# Patient Record
Sex: MALE | NOT HISPANIC OR LATINO | Employment: OTHER | URBAN - METROPOLITAN AREA
[De-identification: names, ages, dates, MRNs, and addresses within clinical notes are randomized per-mention and may not be internally consistent; named-entity substitution may affect disease eponyms.]

---

## 2023-04-25 LAB — CALCIDIOL (25 OH VITAMIN D3) (NG/ML) IN SER/PLAS: 79 NG/ML

## 2023-06-07 LAB
ESTIMATED AVERAGE GLUCOSE FOR HBA1C: 117 MG/DL
HEMOGLOBIN A1C/HEMOGLOBIN TOTAL IN BLOOD: 5.7 %

## 2023-06-13 LAB
ALBUMIN ELP: 4.4 G/DL (ref 3.4–5)
ALPHA 1: 0.3 G/DL (ref 0.2–0.6)
ALPHA 2: 0.7 G/DL (ref 0.4–1.1)
BETA: 0.9 G/DL (ref 0.5–1.2)
GAMMA GLOBULIN: 1.1 G/DL (ref 0.5–1.4)
PATH REVIEW - SERUM IMMUNOFIXATION: NORMAL
PATH REVIEW-SERUM PROTEIN ELECTROPHORESIS: NORMAL
PROTEIN ELECTROPHORESIS INTERPRETATION: NORMAL
PROTEIN TOTAL: 7.5 G/DL (ref 6.4–8.2)
SERUM IMMUNOFIXATION INTERPRETATION: NORMAL

## 2023-12-04 PROBLEM — S72.142A: Status: ACTIVE | Noted: 2023-01-14

## 2023-12-04 PROBLEM — R35.0 URINARY FREQUENCY: Status: ACTIVE | Noted: 2022-08-29

## 2023-12-04 PROBLEM — I31.9 PERICARDITIS (HHS-HCC): Status: ACTIVE | Noted: 2023-12-04

## 2023-12-04 PROBLEM — D64.9 ANEMIA: Status: ACTIVE | Noted: 2023-12-04

## 2023-12-04 PROBLEM — F32.A DEPRESSION: Status: ACTIVE | Noted: 2021-11-11

## 2023-12-04 PROBLEM — R31.0 GROSS HEMATURIA: Status: ACTIVE | Noted: 2022-01-12

## 2023-12-04 PROBLEM — N39.41 URGE INCONTINENCE: Status: ACTIVE | Noted: 2022-08-29

## 2023-12-04 PROBLEM — J45.909 ASTHMA (HHS-HCC): Status: ACTIVE | Noted: 2021-11-11
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
## 2023-12-04 PROBLEM — K92.2 GI BLEED: Status: ACTIVE | Noted: 2023-12-04

## 2023-12-04 PROBLEM — I77.810 ASCENDING AORTA DILATATION (CMS-HCC): Status: ACTIVE | Noted: 2023-03-03

## 2023-12-04 PROBLEM — G60.3 IDIOPATHIC PROGRESSIVE NEUROPATHY: Status: ACTIVE | Noted: 2021-02-15

## 2023-12-04 PROBLEM — F32.9 MAJOR DEPRESSION: Status: ACTIVE | Noted: 2023-12-04

## 2023-12-04 PROBLEM — F41.1 GAD (GENERALIZED ANXIETY DISORDER): Status: ACTIVE | Noted: 2018-06-14

## 2023-12-04 PROBLEM — G47.33 OSA ON CPAP: Status: ACTIVE | Noted: 2021-11-11

## 2023-12-04 PROBLEM — R97.20 ELEVATED PSA: Status: ACTIVE | Noted: 2022-01-12

## 2023-12-04 PROBLEM — N40.0 BENIGN PROSTATIC HYPERPLASIA: Status: ACTIVE | Noted: 2021-11-11

## 2023-12-04 PROBLEM — K21.9 ESOPHAGEAL REFLUX: Status: ACTIVE | Noted: 2021-11-11

## 2023-12-04 PROBLEM — R39.15 URINARY URGENCY: Status: ACTIVE | Noted: 2022-08-29

## 2023-12-04 PROBLEM — M54.16 LUMBAR RADICULOPATHY, CHRONIC: Status: ACTIVE | Noted: 2021-03-25

## 2023-12-04 PROBLEM — S72.009A CLOSED FRACTURE OF HIP (MULTI): Status: ACTIVE | Noted: 2023-01-14

## 2023-12-04 PROBLEM — Z99.89 CONTINUOUS POSITIVE AIRWAY PRESSURE DEPENDENCE: Status: ACTIVE | Noted: 2023-12-04

## 2023-12-04 RX ORDER — LACTULOSE 10 G/15ML
20 SOLUTION ORAL; RECTAL 3 TIMES DAILY PRN
COMMUNITY
Start: 2023-01-19 | End: 2023-12-07 | Stop reason: ALTCHOICE

## 2023-12-04 RX ORDER — ZINC GLUCONATE 50 MG
50 TABLET ORAL
COMMUNITY

## 2023-12-04 RX ORDER — OXYBUTYNIN CHLORIDE 5 MG/1
5 TABLET ORAL
COMMUNITY
End: 2023-12-07 | Stop reason: ALTCHOICE

## 2023-12-04 RX ORDER — BUDESONIDE AND FORMOTEROL FUMARATE DIHYDRATE 160; 4.5 UG/1; UG/1
2 AEROSOL RESPIRATORY (INHALATION)
COMMUNITY
End: 2023-12-07 | Stop reason: ALTCHOICE

## 2023-12-04 RX ORDER — SOLIFENACIN SUCCINATE 10 MG/1
10 TABLET, FILM COATED ORAL DAILY
COMMUNITY
Start: 2022-08-29 | End: 2023-12-07 | Stop reason: ALTCHOICE

## 2023-12-04 RX ORDER — SERTRALINE HYDROCHLORIDE 100 MG/1
200 TABLET, FILM COATED ORAL
COMMUNITY
Start: 2020-02-26

## 2023-12-04 RX ORDER — ALBUTEROL SULFATE 90 UG/1
2 AEROSOL, METERED RESPIRATORY (INHALATION) EVERY 4 HOURS PRN
COMMUNITY
Start: 2020-02-26

## 2023-12-04 RX ORDER — BUPROPION HYDROCHLORIDE 75 MG/1
75 TABLET ORAL 2 TIMES DAILY
COMMUNITY

## 2023-12-04 RX ORDER — AMOXICILLIN 250 MG
2 CAPSULE ORAL 2 TIMES DAILY
COMMUNITY
Start: 2023-01-19 | End: 2023-12-07 | Stop reason: ALTCHOICE

## 2023-12-04 RX ORDER — FINASTERIDE 5 MG/1
5 TABLET, FILM COATED ORAL
COMMUNITY
Start: 2023-01-19 | End: 2023-12-07 | Stop reason: ALTCHOICE

## 2023-12-04 RX ORDER — MONTELUKAST SODIUM 10 MG/1
10 TABLET ORAL
COMMUNITY
Start: 2022-08-03 | End: 2023-12-07 | Stop reason: ALTCHOICE

## 2023-12-04 RX ORDER — CARBIDOPA AND LEVODOPA 25; 100 MG/1; MG/1
1 TABLET ORAL 3 TIMES DAILY
COMMUNITY
Start: 2023-02-15 | End: 2023-12-07 | Stop reason: ALTCHOICE

## 2023-12-04 RX ORDER — MIRABEGRON 25 MG/1
25 TABLET, FILM COATED, EXTENDED RELEASE ORAL
COMMUNITY
Start: 2021-09-22 | End: 2023-12-07 | Stop reason: ALTCHOICE

## 2023-12-07 ENCOUNTER — OFFICE VISIT (OUTPATIENT)
Dept: NEUROLOGY | Facility: CLINIC | Age: 63
End: 2023-12-07
Payer: COMMERCIAL

## 2023-12-07 VITALS
DIASTOLIC BLOOD PRESSURE: 77 MMHG | HEIGHT: 75 IN | HEART RATE: 71 BPM | SYSTOLIC BLOOD PRESSURE: 149 MMHG | BODY MASS INDEX: 26.7 KG/M2 | WEIGHT: 214.7 LBS | TEMPERATURE: 98.6 F

## 2023-12-07 DIAGNOSIS — G62.89 OTHER POLYNEUROPATHY: Primary | ICD-10-CM

## 2023-12-07 PROCEDURE — 99213 OFFICE O/P EST LOW 20 MIN: CPT | Performed by: PSYCHIATRY & NEUROLOGY

## 2023-12-07 PROCEDURE — 1036F TOBACCO NON-USER: CPT | Performed by: PSYCHIATRY & NEUROLOGY

## 2023-12-07 RX ORDER — VIT C/E/ZN/COPPR/LUTEIN/ZEAXAN 250MG-90MG
25 CAPSULE ORAL DAILY
COMMUNITY

## 2023-12-07 RX ORDER — CLOBETASOL PROPIONATE 0.5 MG/G
1 CREAM TOPICAL
COMMUNITY
Start: 2023-09-13

## 2023-12-07 RX ORDER — DOCUSATE SODIUM 100 MG/1
100 CAPSULE, LIQUID FILLED ORAL
COMMUNITY
Start: 2023-01-31 | End: 2023-12-07 | Stop reason: ALTCHOICE

## 2023-12-07 RX ORDER — VITAMIN B COMPLEX
1 CAPSULE ORAL DAILY
COMMUNITY

## 2023-12-07 ASSESSMENT — PAIN SCALES - GENERAL: PAINLEVEL: 2

## 2023-12-07 NOTE — PROGRESS NOTES
"Subjective   Aurelio Jones is a 63 y.o. male who comes in for follow up of initial evaluation of back issues.    He saw a sport medicine doctor who did an MRI and then did a bone scan.  Then he had an MRI with contrast.  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  He reports that his neuropathy symptoms are stable.     He has plateau with PT.  He has pain in the left hip and lateral thigh.  He is getting a shot in the hip coming up and he is going to see a spine doctor in January.  He broke his hip in January and he has not recovered his mobility from then.       He is not functioning at the level he would like to.  He restarted PT yesterday.  He feels weaker in the left leg.      He reports that his PCP is not took worried about his pre-diabetes.     Review of Systems    Objective   /77 (BP Location: Right arm, Patient Position: Sitting, BP Cuff Size: Adult)   Pulse 71   Temp 37 °C (98.6 °F)   Ht 1.905 m (6' 3\")   Wt 97.4 kg (214 lb 11.2 oz)   BMI 26.84 kg/m²   Neurological Exam  Physical Exam        MR brain wo IV contrast    Result Date: 3/21/2023  Patient Name: AURELIO JONES : 1960 [id number] Accession: [id number] Exam Date/Time: 2023 11:05 Procedure: MR BRAIN WO CONTRAST Ordering Provider: EM DMITRI Reason For Exam: Parkinsonian syndrome   MRI BRAIN WITHOUT CONTRAST INDICATIONS: Parkinsonian syndrome COMPARISON: None TECHNIQUE: Multiplanar, multisequence MRI of the brain was performed without contrast. FINDINGS: Acute Change: No evidence of acute infarction. No fluid collection or intracranial hemorrhage. Mass Lesion: None. Parenchyma: Scattered small patchy foci of nonspecific T2/FLAIR hyperintensity in the supratentorial white matter. Ventricles and sulci: Mild, predominately central brain parenchymal volume loss with commensurate mild enlargement of the ventricles. No disproportionate atrophy of the " basal ganglia, brainstem or cerebellum. Skull base: Normal appearance of the pituitary gland. Normal position of the cerebellar tonsils. Vessels: The major intracranial flow voids are preserved. Extracranial structures: Normal orbits. No extracranial soft tissue abnormalities. Sinuses/mastoids: Mild mucosal thickening in the paranasal sinuses. Clear tympanomastoid cavities. Bones: No pathologic marrow infiltration.     1. No acute intracranial abnormalities. 2. Mild central white matter volume loss with mild chronic microvascular ischemic changes in the supratentorial white matter. 3. No focal abnormalities of the basal ganglia, brainstem or cerebellum. Report Dictated on Workstation: [id number]  Electronically Signed By: Jethro Hope  Electronically Signed Date/Time: 3/21/2023 12:07 PM EDT       Reviewed the results of his MRI L spine from 11/20/2023.     Assessment/Plan   Mr. Lucero is a 63 year old man presenting to the neurology clinic today for follow up of neuropathy. Patient was a prior patient of Dr. Tim.  EMG showed axonal neuropathy. His exam showed findings suggestive of a underlying inherited neuropathy with pes planus, hammertoes and thinning of the anterior calf muscles.  Blood work for reversible causes was negative other than mild pre-diabetes.  He is not interested in genetic testing.    Having increased issues with left hip/thigh pain. Could be lumbar etiology. He is working with a spine specialist at [facility]. EMG of the left lower extremity could be considered.    Follow up in 1 year or sooner if needed.    Lola Scherer DO

## 2023-12-07 NOTE — PATIENT INSTRUCTIONS
Overall the neuropathy seems stable.  An EMG of the left leg could be considered for further evaluation of the back to determine the cause of the persistent symptoms in the leg.  Follow up in 1 year.

## 2024-02-14 ENCOUNTER — LAB (OUTPATIENT)
Dept: LAB | Facility: LAB | Age: 64
End: 2024-02-14
Payer: COMMERCIAL

## 2024-02-14 DIAGNOSIS — Z13.1 ENCOUNTER FOR SCREENING FOR DIABETES MELLITUS: ICD-10-CM

## 2024-02-14 DIAGNOSIS — Z11.3 ENCOUNTER FOR SCREENING FOR INFECTIONS WITH A PREDOMINANTLY SEXUAL MODE OF TRANSMISSION: Primary | ICD-10-CM

## 2024-02-14 DIAGNOSIS — E78.00 PURE HYPERCHOLESTEROLEMIA, UNSPECIFIED: ICD-10-CM

## 2024-02-14 DIAGNOSIS — R53.82 CHRONIC FATIGUE, UNSPECIFIED: ICD-10-CM

## 2024-02-14 DIAGNOSIS — Z12.5 ENCOUNTER FOR SCREENING FOR MALIGNANT NEOPLASM OF PROSTATE: ICD-10-CM

## 2024-02-14 DIAGNOSIS — E55.9 VITAMIN D DEFICIENCY, UNSPECIFIED: ICD-10-CM

## 2024-02-14 LAB
25(OH)D3 SERPL-MCNC: 61 NG/ML (ref 30–100)
ALBUMIN SERPL BCP-MCNC: 3.9 G/DL (ref 3.4–5)
ALP SERPL-CCNC: 95 U/L (ref 33–136)
ALT SERPL W P-5'-P-CCNC: 19 U/L (ref 10–52)
ANION GAP SERPL CALC-SCNC: 11 MMOL/L (ref 10–20)
AST SERPL W P-5'-P-CCNC: 18 U/L (ref 9–39)
BASOPHILS # BLD AUTO: 0.07 X10*3/UL (ref 0–0.1)
BASOPHILS NFR BLD AUTO: 1.4 %
BILIRUB SERPL-MCNC: 0.5 MG/DL (ref 0–1.2)
BUN SERPL-MCNC: 10 MG/DL (ref 6–23)
CALCIUM SERPL-MCNC: 9.5 MG/DL (ref 8.6–10.6)
CHLORIDE SERPL-SCNC: 103 MMOL/L (ref 98–107)
CHOLEST SERPL-MCNC: 158 MG/DL (ref 0–199)
CHOLESTEROL/HDL RATIO: 3.3
CO2 SERPL-SCNC: 32 MMOL/L (ref 21–32)
CREAT SERPL-MCNC: 0.9 MG/DL (ref 0.5–1.3)
EGFRCR SERPLBLD CKD-EPI 2021: >90 ML/MIN/1.73M*2
EOSINOPHIL # BLD AUTO: 0.29 X10*3/UL (ref 0–0.7)
EOSINOPHIL NFR BLD AUTO: 5.7 %
ERYTHROCYTE [DISTWIDTH] IN BLOOD BY AUTOMATED COUNT: 14 % (ref 11.5–14.5)
EST. AVERAGE GLUCOSE BLD GHB EST-MCNC: 108 MG/DL
GLUCOSE SERPL-MCNC: 80 MG/DL (ref 74–99)
HBA1C MFR BLD: 5.4 %
HBV SURFACE AG SERPL QL IA: NONREACTIVE
HCT VFR BLD AUTO: 42.4 % (ref 41–52)
HDLC SERPL-MCNC: 47.6 MG/DL
HGB BLD-MCNC: 13.8 G/DL (ref 13.5–17.5)
HIV 1+2 AB+HIV1 P24 AG SERPL QL IA: NONREACTIVE
IMM GRANULOCYTES # BLD AUTO: 0.01 X10*3/UL (ref 0–0.7)
IMM GRANULOCYTES NFR BLD AUTO: 0.2 % (ref 0–0.9)
LDLC SERPL CALC-MCNC: 95 MG/DL
LYMPHOCYTES # BLD AUTO: 1.01 X10*3/UL (ref 1.2–4.8)
LYMPHOCYTES NFR BLD AUTO: 19.8 %
MCH RBC QN AUTO: 28.4 PG (ref 26–34)
MCHC RBC AUTO-ENTMCNC: 32.5 G/DL (ref 32–36)
MCV RBC AUTO: 87 FL (ref 80–100)
MONOCYTES # BLD AUTO: 0.47 X10*3/UL (ref 0.1–1)
MONOCYTES NFR BLD AUTO: 9.2 %
NEUTROPHILS # BLD AUTO: 3.24 X10*3/UL (ref 1.2–7.7)
NEUTROPHILS NFR BLD AUTO: 63.7 %
NON HDL CHOLESTEROL: 110 MG/DL (ref 0–149)
NRBC BLD-RTO: 0 /100 WBCS (ref 0–0)
PLATELET # BLD AUTO: 285 X10*3/UL (ref 150–450)
POTASSIUM SERPL-SCNC: 4.5 MMOL/L (ref 3.5–5.3)
PROT SERPL-MCNC: 6.5 G/DL (ref 6.4–8.2)
PSA SERPL-MCNC: 1.58 NG/ML
RBC # BLD AUTO: 4.86 X10*6/UL (ref 4.5–5.9)
SODIUM SERPL-SCNC: 141 MMOL/L (ref 136–145)
TRIGL SERPL-MCNC: 79 MG/DL (ref 0–149)
TSH SERPL-ACNC: 2.6 MIU/L (ref 0.44–3.98)
VLDL: 16 MG/DL (ref 0–40)
WBC # BLD AUTO: 5.1 X10*3/UL (ref 4.4–11.3)

## 2024-02-14 PROCEDURE — 84402 ASSAY OF FREE TESTOSTERONE: CPT

## 2024-02-14 PROCEDURE — 36415 COLL VENOUS BLD VENIPUNCTURE: CPT

## 2024-02-14 PROCEDURE — 83036 HEMOGLOBIN GLYCOSYLATED A1C: CPT

## 2024-02-14 PROCEDURE — 86318 IA INFECTIOUS AGENT ANTIBODY: CPT

## 2024-02-14 PROCEDURE — 80053 COMPREHEN METABOLIC PANEL: CPT

## 2024-02-14 PROCEDURE — 87389 HIV-1 AG W/HIV-1&-2 AB AG IA: CPT

## 2024-02-14 PROCEDURE — 86803 HEPATITIS C AB TEST: CPT

## 2024-02-14 PROCEDURE — 82306 VITAMIN D 25 HYDROXY: CPT

## 2024-02-14 PROCEDURE — 85025 COMPLETE CBC W/AUTO DIFF WBC: CPT

## 2024-02-14 PROCEDURE — 80061 LIPID PANEL: CPT

## 2024-02-14 PROCEDURE — 84153 ASSAY OF PSA TOTAL: CPT

## 2024-02-14 PROCEDURE — 87340 HEPATITIS B SURFACE AG IA: CPT

## 2024-02-14 PROCEDURE — 84443 ASSAY THYROID STIM HORMONE: CPT

## 2024-02-15 LAB
HCV AB SER QL: NONREACTIVE
RPR SER QL: NONREACTIVE

## 2024-02-18 LAB
TESTOSTERONE FREE (CHAN): 44.3 PG/ML (ref 35–155)
TESTOSTERONE,TOTAL,LC-MS/MS: 583 NG/DL (ref 250–1100)

## 2024-02-19 LAB — SCAN RESULT: NORMAL

## 2024-12-11 ENCOUNTER — APPOINTMENT (OUTPATIENT)
Dept: NEUROLOGY | Facility: CLINIC | Age: 64
End: 2024-12-11
Payer: COMMERCIAL

## 2024-12-11 VITALS
SYSTOLIC BLOOD PRESSURE: 120 MMHG | HEIGHT: 76 IN | BODY MASS INDEX: 23.61 KG/M2 | DIASTOLIC BLOOD PRESSURE: 79 MMHG | HEART RATE: 65 BPM | WEIGHT: 193.9 LBS

## 2024-12-11 DIAGNOSIS — G60.3 IDIOPATHIC PROGRESSIVE NEUROPATHY: Primary | ICD-10-CM

## 2024-12-11 PROBLEM — H93.13 TINNITUS, BILATERAL: Status: ACTIVE | Noted: 2024-12-11

## 2024-12-11 PROCEDURE — 1036F TOBACCO NON-USER: CPT | Performed by: PSYCHIATRY & NEUROLOGY

## 2024-12-11 PROCEDURE — 99213 OFFICE O/P EST LOW 20 MIN: CPT | Performed by: PSYCHIATRY & NEUROLOGY

## 2024-12-11 PROCEDURE — 3008F BODY MASS INDEX DOCD: CPT | Performed by: PSYCHIATRY & NEUROLOGY

## 2024-12-11 ASSESSMENT — PAIN SCALES - GENERAL: PAINLEVEL_OUTOF10: 0-NO PAIN

## 2024-12-11 NOTE — PROGRESS NOTES
"Subjective   Aurelio Lucero is a 64 y.o. male who comes in for follow up of peripheral neuropathy.    He reports he has been having a lot of issues with his leg.  It seems to be better.  He had a work up including EMG, MRI L spine and hip imaging. He was found to have a gluteus minimus tear.     His toe is getting stuck down.  He does have hammer toes.   He has not seen podiatry.     Review of Systems    Objective   /79 (BP Location: Right arm, Patient Position: Sitting, BP Cuff Size: Large adult long)   Pulse 65   Ht 1.918 m (6' 3.5\")   Wt 88 kg (193 lb 14.4 oz)   BMI 23.92 kg/m²   Neurological Exam  Physical Exam    There is full strength in distal muscles of the upper and lower extremities bilaterally. Deep tendon reflexes are 2/4 and symmetric throughout the upper and lower extremities bilaterally, except the ankle jerks which are absent. On coordination testing, finger-nose-finger testing are done well bilaterally. Sensory examination reveals absent vibratory sense in the toes and impaired at the ankles.  Gait is normal.     Assessment/Plan     Mr. Lucero is a 63 year old man presenting to the neurology clinic today for follow up of neuropathy. Patient was a prior patient of Dr. Tim.  EMG showed axonal neuropathy. His exam showed findings suggestive of a underlying inherited neuropathy with pes planus, hammertoes and thinning of the anterior calf muscles.  Blood work for reversible causes was negative other than mild pre-diabetes.  He is not interested in genetic testing.     Overall he is stable.  Reviewed work up for left hip/thigh pain including EMG study and MRI L spine report.  PT for gluteus minimus weakness could be helpful.    Follow up in 2 years or sooner if needed.    Lola Scherer DO  "